# Patient Record
Sex: MALE | Race: WHITE | URBAN - METROPOLITAN AREA
[De-identification: names, ages, dates, MRNs, and addresses within clinical notes are randomized per-mention and may not be internally consistent; named-entity substitution may affect disease eponyms.]

---

## 2017-02-14 ENCOUNTER — TRANSFERRED RECORDS (OUTPATIENT)
Dept: HEALTH INFORMATION MANAGEMENT | Facility: CLINIC | Age: 67
End: 2017-02-14

## 2017-03-01 ENCOUNTER — ONCOLOGY VISIT (OUTPATIENT)
Dept: ONCOLOGY | Facility: CLINIC | Age: 67
End: 2017-03-01
Payer: COMMERCIAL

## 2017-03-01 VITALS
TEMPERATURE: 97.6 F | DIASTOLIC BLOOD PRESSURE: 67 MMHG | HEART RATE: 80 BPM | BODY MASS INDEX: 24.71 KG/M2 | SYSTOLIC BLOOD PRESSURE: 132 MMHG | OXYGEN SATURATION: 98 % | WEIGHT: 163 LBS | HEIGHT: 68 IN | RESPIRATION RATE: 20 BRPM

## 2017-03-01 DIAGNOSIS — C61 PROSTATE CANCER (H): Primary | ICD-10-CM

## 2017-03-01 LAB — PSA SERPL-MCNC: 0.11 UG/L (ref 0–4)

## 2017-03-01 PROCEDURE — 36415 COLL VENOUS BLD VENIPUNCTURE: CPT | Performed by: INTERNAL MEDICINE

## 2017-03-01 PROCEDURE — 99204 OFFICE O/P NEW MOD 45 MIN: CPT | Performed by: INTERNAL MEDICINE

## 2017-03-01 PROCEDURE — 84153 ASSAY OF PSA TOTAL: CPT | Performed by: INTERNAL MEDICINE

## 2017-03-01 ASSESSMENT — PAIN SCALES - GENERAL: PAINLEVEL: MODERATE PAIN (5)

## 2017-03-01 NOTE — PROGRESS NOTES
Oncology Initial visit:  Date on this visit: 3/1/2017    Gallito Hollingsworth  is referred by Dr.Referred Zimmer for an oncology consultation. He requires evaluation for diagnosis of prostate cancer    Primary Physician: Rhys Grigsby     History Of Present Illness:      HISTORY:  Gallito is a 66-year-old male who is here with his wife.  He has a past medical history significant for prostate cancer, which was diagnosed in 2009.  He had a radical prostatectomy done in 04/2009, and the final pathology was a pT2c N0 MX adenocarcinoma of the prostate with a [place] score of 3+3.  No angiolymphatic invasion was present, but perineural invasion was seen.  No extracapsular extension was seen.  Three benign pelvic lymph nodes were removed.  Prior to the surgery, he had a PSA of 5.5.  It was all diagnosed because he presented with hematuria, although he did not have any other symptoms at that time.  He recovered well from surgery, and his PSA became undetectable.  Since then, he has been on observation.  He has been doing well, but in 04/2013 he was noted to have a PSA of 0.08.  At that point, he was being followed expectantly.  In 12/2014, he had a PSA of 0.1.  In 07/2015, it was 0.07.  In 02/2016, it was 0.12.  In 07/2016, it was 0.1.  Most recently, on 11/11/2016, his PSA was 0.13.  Previously, his urologist discussed with him about the possibility of salvage radiation, but at this time he is not sure whether he wants to pursue that or not.  He wants to switch his care to another urologist, and he is asking me if I can give him a referral.  He otherwise continues to feel well.  He recently had a lot of issues with back pain, and he had an MRI done of the lumbar spine on 02/14/2017, which showed bilateral severe facet arthropathy at L4-L5 with grade 1 anterolisthesis of L4 on L5.  There is a ganglion cyst extending anteriorly from the right L4-L5 facet anteriorly into the epidural space, resulting in marked narrowing of the  right lateral recess/subarticular region with compression of the traversing right L5 nerve root.  There is reactive bone marrow edema of the right pedicles of L4 on L5 related to the facet arthropathy.  There is canal stenosis due to the facet arthropathy and the ganglion cyst at the level of L4-L5.  There is also mild bilateral neural foraminal narrowing.  There was mild bilateral neural foraminal narrowing at L3-L4, mild right-sided neural foraminal narrowing at L2-L3, disc bulges and mild facet arthropathy at these levels.  For these findings, he had back surgery about 1 week ago, which really helped his pain, and he feels like a new man right now.  He is not on any pain medication.  He denies any other pain.  His energy is good.  He has been able to eat and drink well.  No weight loss.  No skin problems.  No neurological problems or neuropathy.  No infection, no urinary troubles, no trouble breathing.  No new lumps, bumps or swellings.             ROS:  A comprehensive ROS was otherwise neg      Past Medical/Surgical History:  Past Medical History   Diagnosis Date     Cancer (H) 2008     Prostate     Past Surgical History   Procedure Laterality Date     Colonoscopy       Orthopedic surgery  2009     Rt ankle surgery. Hardware.     Genitourinary surgery  2008     Prostatectomy     Esophagoscopy, gastroscopy, duodenoscopy (egd), combined  11/26/2013     Procedure: COMBINED ESOPHAGOSCOPY, GASTROSCOPY, DUODENOSCOPY (EGD), BIOPSY SINGLE OR MULTIPLE;;  Surgeon: Eugene Barajas MD;  Location: [facility] OR     Cancer History:   As above    Allergies:  Allergies as of 03/01/2017     (No Known Allergies)     Current Medications:  Current Outpatient Prescriptions   Medication Sig Dispense Refill     SIMVASTATIN PO Take 20 mg by mouth At Bedtime       LISINOPRIL PO Take 20 mg by mouth daily       ASPIRIN PO Take 81 mg by mouth daily        Family History:  History reviewed. No pertinent family history.  Social  "History:  Social History     Social History     Marital status:      Spouse name: N/A     Number of children: N/A     Years of education: N/A     Occupational History     Not on file.     Social History Main Topics     Smoking status: Former Smoker     Smokeless tobacco: Former User     Alcohol use Yes     Drug use: Not on file     Sexual activity: Not on file     Other Topics Concern     Not on file     Social History Narrative     Physical Exam:  /67 (BP Location: Left arm, Patient Position: Chair, Cuff Size: Adult Large)  Pulse 80  Temp 97.6  F (36.4  C) (Oral)  Resp 20  Ht 1.727 m (5' 7.99\")  Wt 73.9 kg (163 lb)  SpO2 98%  BMI 24.79 kg/m2  CONSTITUTIONAL: no acute distress  EYES: PERRLA, no palor or icterus.   ENT/MOUTH: no mouth lesions. Oropharynx normal  CVS: s1s2 no m r g .   RESPIRATORY: clear to auscultation b/l  GI: soft non tender no hepatosplenomegaly  NEURO: AAOX3  Grossly non focal neuro exam  INTEGUMENT: no obvious rashes  LYMPHATIC: no palpable cervical, supraclavicular, axillary or inguinal LAD  MUSCULOSKELETAL: Unremarkable. No bony tenderness.   EXTREMITIES: no edema  PSYCH: Mentation, mood and affect are normal. Decision making capacity is intact    Laboratory/Imaging Studies    Reviewed and as mentioned above    ASSESSMENT/PLAN  65 y/o Male who had radcal prostatectomy done on 4/13/09 for Stage IIB pT2c N0Mx adenocarcinoma of the prostate [place] 3+3 with no ALI but PNI positive. No extracapsular extension was found. 3 pelvic lymph nodes were benign. Initial PSA was 5.5 which became undetectable after surgery and remained undetectable till April 2013 when it was 0.08. Since then his PSA is very slowly rising with most recent one in Nov 2016 being 0.13.  He has been on observation.  There is evidence of biochemical recurrence with very slow rise of PSA. At this time he does not need systemic imaging   I recommend that he be evaluated by Rad Onc for salvage radiation with the goal of " cure of his disease.    The other option would be continued surveillance since the rise of PSA is very slow.    We will repeat PSA today    He agrees to see Rad Onc and I will refer him for that    Going forward he can follow up with a Urologist for surveillance and see me on an as needed basis. I gave him a referral to see a Urologist as he wants to switch care from his previous Urologist    All of this was explained to him and all of his questions were answered to his satisfaction.  He is agreeable and comfortable with the plan    Fior Curry

## 2017-03-01 NOTE — NURSING NOTE
"Gallito Hollingsworth is a 66 year old male who presents for:  Chief Complaint   Patient presents with     Oncology Clinic Visit     NEW-Hx of prostate ca        Initial Vitals:  /67 (BP Location: Left arm, Patient Position: Chair, Cuff Size: Adult Large)  Pulse 80  Temp 97.6  F (36.4  C) (Oral)  Resp 20  Ht 1.727 m (5' 7.99\")  Wt 73.9 kg (163 lb)  SpO2 98%  BMI 24.79 kg/m2 Estimated body mass index is 24.79 kg/(m^2) as calculated from the following:    Height as of this encounter: 1.727 m (5' 7.99\").    Weight as of this encounter: 73.9 kg (163 lb).. Body surface area is 1.88 meters squared. BP completed using cuff size: regular  Moderate Pain (5) No LMP for male patient. Allergies and medications reviewed.     Medications: Medication refills not needed today.  Pharmacy name entered into [employer]: [id number] - [facility] [address] [facility]    Comments:     8 minutes for nursing intake (face to face time)   OSIEL JOHN LPN        "

## 2017-03-01 NOTE — MR AVS SNAPSHOT
After Visit Summary   3/1/2017    Gallito Hollingsworth    MRN: [id number]           Patient Information     Date Of Birth          1950        Visit Information        Provider Department      3/1/2017 11:15 AM Fior Curry MD [facility]        Today's Diagnoses     Prostate cancer (H)    -  1      Care Instructions    PSA today    Refer to Rad Onc for salvage radiation    Refer to Urology    See me back as needed        Follow-ups after your visit        Additional Services     RAD ONCOLOGY REFERRAL       Your provider has referred you to: Rad Onc for salvage radiation    Please be aware that coverage of these services is subject to the terms and limitations of your health insurance plan.  Call member services at your health plan with any benefit or coverage questions.      Please bring the following with you to your appointment:    (1) Any X-Rays, CTs or MRIs which have been performed.  Contact the facility where they were done to arrange for  prior to your scheduled appointment.    (2) List of current medications   (3) This referral request   (4) Any documents/labs given to you for this referral            UROLOGY ADULT REFERRAL       Your provider has referred you to: PREFERRED PROVIDERS:    Please be aware that coverage of these services is subject to the terms and limitations of your health insurance plan.  Call member services at your health plan with any benefit or coverage questions.      Please bring the following with you to your appointment:    (1) Any X-Rays, CTs or MRIs which have been performed.  Contact the facility where they were done to arrange for  prior to your scheduled appointment.    (2) List of current medications  (3) This referral request   (4) Any documents/labs given to you for this referral                  Your next 10 appointments already scheduled     Mar 22, 2017  1:00 PM CDT   New Visit with Fili Hartley MD   [facility]  [facility]    [address] [place] [id number] [phone]            Mar 22, 2017  2:30 PM CDT   Ct Sim with Fili Hartley MD, [facility] RT SIMULATION   [facility]    [address] [place] [id number] [phone]              Who to contact     If you have questions or need follow up information about today's clinic visit or your schedule please contact [facility] directly at [phone].  Normal or non-critical lab and imaging results will be communicated to you by MyChart, letter or phone within 4 business days after the clinic has received the results. If you do not hear from us within 7 days, please contact the clinic through [employer] or phone. If you have a critical or abnormal lab result, we will notify you by phone as soon as possible.  Submit refill requests through [employer] or call your pharmacy and they will forward the refill request to us. Please allow 3 business days for your refill to be completed.          Additional Information About Your Visit        Adapx[employer]Clipmarks Information     [employer] gives you secure access to your electronic health record. If you see a primary care provider, you can also send messages to your care team and make appointments. If you have questions, please call your primary care clinic.  If you do not have a primary care provider, please call [phone] and they will assist you.      [employer] is an electronic gateway that provides easy, online access to your medical records. With [employer], you can request a clinic appointment, read your test results, renew a prescription or communicate with your care team.     To access your existing account, please contact your [facility] or call [phone] for assistance.        Care EveryWhere ID     This is your Care EveryWhere ID. This could be used by other organizations  "to access your [facility] medical records  [id number]        Your Vitals Were     Pulse Temperature Respirations Height Pulse Oximetry BMI (Body Mass Index)    80 97.6  F (36.4  C) (Oral) 20 1.727 m (5' 7.99\") 98% 24.79 kg/m2       Blood Pressure from Last 3 Encounters:   03/01/17 132/67   11/26/13 141/85    Weight from Last 3 Encounters:   03/01/17 73.9 kg (163 lb)   11/20/13 72.6 kg (160 lb)              We Performed the Following     PSA tumor marker     RAD ONCOLOGY REFERRAL     UROLOGY ADULT REFERRAL        Primary Care Provider Office Phone # Fax #    Rhys Grigsby [phone]       [address] [address]        Thank you!     Thank you for choosing [facility]  for your care. Our goal is always to provide you with excellent care. Hearing back from our patients is one way we can continue to improve our services. Please take a few minutes to complete the written survey that you may receive in the mail after your visit with us. Thank you!             Your Updated Medication List - Protect others around you: Learn how to safely use, store and throw away your medicines at www.disposemymeds.org.          This list is accurate as of: 3/1/17 11:59 PM.  Always use your most recent med list.                   Brand Name Dispense Instructions for use    ASPIRIN PO      Take 81 mg by mouth daily       LISINOPRIL PO      Take 20 mg by mouth daily       SIMVASTATIN PO      Take 20 mg by mouth At Bedtime         "

## 2017-03-03 ENCOUNTER — TELEPHONE (OUTPATIENT)
Dept: ONCOLOGY | Facility: CLINIC | Age: 67
End: 2017-03-03

## 2017-03-03 NOTE — TELEPHONE ENCOUNTER
RN attempted to reach patient. Left VM stating lab result from 3-1-17 was released on [employer]. Provided callback number if patient has questions.  Lazara Khanna RN, BSN, OCN

## 2017-03-22 ENCOUNTER — OFFICE VISIT (OUTPATIENT)
Dept: RADIATION ONCOLOGY | Facility: CLINIC | Age: 67
End: 2017-03-22
Payer: COMMERCIAL

## 2017-03-22 VITALS
DIASTOLIC BLOOD PRESSURE: 67 MMHG | HEART RATE: 63 BPM | BODY MASS INDEX: 24.86 KG/M2 | HEIGHT: 68 IN | TEMPERATURE: 98 F | RESPIRATION RATE: 20 BRPM | SYSTOLIC BLOOD PRESSURE: 126 MMHG | WEIGHT: 164 LBS

## 2017-03-22 DIAGNOSIS — C61 PROSTATE CANCER (H): Primary | ICD-10-CM

## 2017-03-22 PROCEDURE — 99207 ZZC NO BILLABLE SERVICE THIS VISIT: CPT | Performed by: RADIOLOGY

## 2017-03-22 ASSESSMENT — PAIN SCALES - GENERAL: PAINLEVEL: NO PAIN (0)

## 2017-03-22 NOTE — MR AVS SNAPSHOT
After Visit Summary   3/22/2017    Gallito Hollingsworth    MRN: [id number]           Patient Information     Date Of Birth          1950        Visit Information        Provider Department      3/22/2017 1:00 PM Fili Hartley MD [facility]        Today's Diagnoses     Prostate cancer (H)    -  1       Follow-ups after your visit        Who to contact     If you have questions or need follow up information about today's clinic visit or your schedule please contact [facility] directly at [phone].  Normal or non-critical lab and imaging results will be communicated to you by [employer], letter or phone within 4 business days after the clinic has received the results. If you do not hear from us within 7 days, please contact the clinic through [employer] or phone. If you have a critical or abnormal lab result, we will notify you by phone as soon as possible.  Submit refill requests through [employer] or call your pharmacy and they will forward the refill request to us. Please allow 3 business days for your refill to be completed.          Additional Information About Your Visit        MyChart Information     [employer] gives you secure access to your electronic health record. If you see a primary care provider, you can also send messages to your care team and make appointments. If you have questions, please call your primary care clinic.  If you do not have a primary care provider, please call [phone] and they will assist you.      [employer] is an electronic gateway that provides easy, online access to your medical records. With [employer], you can request a clinic appointment, read your test results, renew a prescription or communicate with your care team.     To access your existing account, please contact your [facility] or call [phone] for assistance.        Care EveryWhere ID     This is your Care EveryWhere ID. This could  "be used by other organizations to access your [facility] medical records  [id number]        Your Vitals Were     Pulse Temperature Respirations Height BMI (Body Mass Index)       63 98  F (36.7  C) 20 5' 8\" 24.94 kg/m2        Blood Pressure from Last 3 Encounters:   03/22/17 126/67   03/01/17 132/67   11/26/13 141/85    Weight from Last 3 Encounters:   03/22/17 164 lb   03/01/17 163 lb   11/20/13 160 lb              Today, you had the following     No orders found for display       Primary Care Provider Office Phone # Fax #    Rhys Grigsby [phone]       [address] [address]        Thank you!     Thank you for choosing [facility]  for your care. Our goal is always to provide you with excellent care. Hearing back from our patients is one way we can continue to improve our services. Please take a few minutes to complete the written survey that you may receive in the mail after your visit with us. Thank you!             Your Updated Medication List - Protect others around you: Learn how to safely use, store and throw away your medicines at www.disposemymeds.org.          This list is accurate as of: 3/22/17 11:59 PM.  Always use your most recent med list.                   Brand Name Dispense Instructions for use    ASPIRIN PO      Take 81 mg by mouth daily       LISINOPRIL PO      Take 20 mg by mouth daily       SIMVASTATIN PO      Take 20 mg by mouth At Bedtime         "

## 2017-03-22 NOTE — PROGRESS NOTES
INITIAL PATIENT ASSESSMENT    Diagnosis: Prostate Cancer    Prior radiation therapy: None    Prior chemotherapy: None    Prior hormonal therapy:No    Pain Eval:  Denies    Psychosocial  Living arrangements: Lives with wife in [place]  Fall Risk: independent   referral needs: Not needed    Advanced Directive: No  Implantable Cardiac Device? No    Onset of menarche:   LMP: No LMP for male patient.  Onset of menopause:   Abnormal vaginal bleeding/discharge:   Are you pregnant?   Reproductive note: 3 grown children

## 2017-03-24 NOTE — PROGRESS NOTES
Patient was not seen by me.  Patient was seen by nursing and resident, Dr. Parkinson.  However, patient was unwilling to stay and meet with me.  At this point, I will leave his f/u with urology who Dr. Curry was going to set him up with.  He can come back for another consult if the patient wishes.

## 2019-11-03 ENCOUNTER — HEALTH MAINTENANCE LETTER (OUTPATIENT)
Age: 69
End: 2019-11-03

## 2020-02-10 ENCOUNTER — HEALTH MAINTENANCE LETTER (OUTPATIENT)
Age: 70
End: 2020-02-10

## 2020-11-16 ENCOUNTER — HEALTH MAINTENANCE LETTER (OUTPATIENT)
Age: 70
End: 2020-11-16

## 2021-04-03 ENCOUNTER — HEALTH MAINTENANCE LETTER (OUTPATIENT)
Age: 71
End: 2021-04-03

## 2021-09-18 ENCOUNTER — HEALTH MAINTENANCE LETTER (OUTPATIENT)
Age: 71
End: 2021-09-18

## 2022-04-30 ENCOUNTER — HEALTH MAINTENANCE LETTER (OUTPATIENT)
Age: 72
End: 2022-04-30

## 2022-11-19 ENCOUNTER — HEALTH MAINTENANCE LETTER (OUTPATIENT)
Age: 72
End: 2022-11-19

## 2023-06-01 ENCOUNTER — HEALTH MAINTENANCE LETTER (OUTPATIENT)
Age: 73
End: 2023-06-01

## 2024-06-16 ENCOUNTER — HEALTH MAINTENANCE LETTER (OUTPATIENT)
Age: 74
End: 2024-06-16